# Patient Record
Sex: MALE | Race: WHITE | NOT HISPANIC OR LATINO | URBAN - METROPOLITAN AREA
[De-identification: names, ages, dates, MRNs, and addresses within clinical notes are randomized per-mention and may not be internally consistent; named-entity substitution may affect disease eponyms.]

---

## 2023-10-23 ENCOUNTER — EMERGENCY (EMERGENCY)
Facility: HOSPITAL | Age: 33
LOS: 1 days | Discharge: HOME | End: 2023-10-23
Attending: EMERGENCY MEDICINE
Payer: MEDICAID

## 2023-10-23 VITALS
RESPIRATION RATE: 18 BRPM | HEART RATE: 77 BPM | OXYGEN SATURATION: 99 % | TEMPERATURE: 98 F | DIASTOLIC BLOOD PRESSURE: 76 MMHG | SYSTOLIC BLOOD PRESSURE: 138 MMHG

## 2023-10-23 VITALS
TEMPERATURE: 98 F | WEIGHT: 156.53 LBS | OXYGEN SATURATION: 97 % | RESPIRATION RATE: 17 BRPM | SYSTOLIC BLOOD PRESSURE: 143 MMHG | HEART RATE: 61 BPM | DIASTOLIC BLOOD PRESSURE: 87 MMHG

## 2023-10-23 LAB
APPEARANCE UR: CLEAR — SIGNIFICANT CHANGE UP
APPEARANCE UR: CLEAR — SIGNIFICANT CHANGE UP
BACTERIA # UR AUTO: ABNORMAL /HPF
BACTERIA # UR AUTO: ABNORMAL /HPF
BILIRUB UR-MCNC: NEGATIVE — SIGNIFICANT CHANGE UP
BILIRUB UR-MCNC: NEGATIVE — SIGNIFICANT CHANGE UP
COLOR SPEC: YELLOW — SIGNIFICANT CHANGE UP
COLOR SPEC: YELLOW — SIGNIFICANT CHANGE UP
DIFF PNL FLD: NEGATIVE — SIGNIFICANT CHANGE UP
DIFF PNL FLD: NEGATIVE — SIGNIFICANT CHANGE UP
GLUCOSE UR QL: NEGATIVE MG/DL — SIGNIFICANT CHANGE UP
GLUCOSE UR QL: NEGATIVE MG/DL — SIGNIFICANT CHANGE UP
KETONES UR-MCNC: NEGATIVE MG/DL — SIGNIFICANT CHANGE UP
KETONES UR-MCNC: NEGATIVE MG/DL — SIGNIFICANT CHANGE UP
LEUKOCYTE ESTERASE UR-ACNC: NEGATIVE — SIGNIFICANT CHANGE UP
LEUKOCYTE ESTERASE UR-ACNC: NEGATIVE — SIGNIFICANT CHANGE UP
NITRITE UR-MCNC: NEGATIVE — SIGNIFICANT CHANGE UP
NITRITE UR-MCNC: NEGATIVE — SIGNIFICANT CHANGE UP
PH UR: 5.5 — SIGNIFICANT CHANGE UP (ref 5–8)
PH UR: 5.5 — SIGNIFICANT CHANGE UP (ref 5–8)
PROT UR-MCNC: NEGATIVE MG/DL — SIGNIFICANT CHANGE UP
PROT UR-MCNC: NEGATIVE MG/DL — SIGNIFICANT CHANGE UP
RBC CASTS # UR COMP ASSIST: 1 /HPF — SIGNIFICANT CHANGE UP (ref 0–4)
RBC CASTS # UR COMP ASSIST: 1 /HPF — SIGNIFICANT CHANGE UP (ref 0–4)
SP GR SPEC: 1.01 — SIGNIFICANT CHANGE UP (ref 1–1.03)
SP GR SPEC: 1.01 — SIGNIFICANT CHANGE UP (ref 1–1.03)
UROBILINOGEN FLD QL: 1 MG/DL — SIGNIFICANT CHANGE UP (ref 0.2–1)
UROBILINOGEN FLD QL: 1 MG/DL — SIGNIFICANT CHANGE UP (ref 0.2–1)
WBC UR QL: 0 /HPF — SIGNIFICANT CHANGE UP (ref 0–5)
WBC UR QL: 0 /HPF — SIGNIFICANT CHANGE UP (ref 0–5)

## 2023-10-23 PROCEDURE — 76870 US EXAM SCROTUM: CPT

## 2023-10-23 PROCEDURE — 76870 US EXAM SCROTUM: CPT | Mod: 26

## 2023-10-23 PROCEDURE — 87591 N.GONORRHOEAE DNA AMP PROB: CPT

## 2023-10-23 PROCEDURE — 99284 EMERGENCY DEPT VISIT MOD MDM: CPT | Mod: 25

## 2023-10-23 PROCEDURE — 99284 EMERGENCY DEPT VISIT MOD MDM: CPT

## 2023-10-23 PROCEDURE — 87086 URINE CULTURE/COLONY COUNT: CPT

## 2023-10-23 PROCEDURE — 87491 CHLMYD TRACH DNA AMP PROBE: CPT

## 2023-10-23 PROCEDURE — 81001 URINALYSIS AUTO W/SCOPE: CPT

## 2023-10-23 NOTE — ED PROVIDER NOTE - CLINICAL SUMMARY MEDICAL DECISION MAKING FREE TEXT BOX
33-year-old male, presents for left-sided testicular pain and swelling x1 week with increased menses of urination.  Went to primary care doctor today and was referred for further evaluation.  Exam of low suspicion for torsion, indirect/direct hernia.  No scrotal tenderness erythema or induration.  Plan ultrasound to assess for epididymitis, urinalysis to assess for infection.  Patient reports not sexually active.  Lower suspicion for STD. 33-year-old male, presents for left-sided testicular pain and swelling x1 week with increased menses of urination.  Went to primary care doctor today and was referred for further evaluation.  Exam of low suspicion for torsion, indirect/direct hernia.  No scrotal tenderness erythema or induration.  Plan ultrasound to assess for epididymitis, urinalysis to assess for infection.  Patient reports not sexually active.  Lower suspicion for STD.    17: 22–ultrasound shows varicocele, microlithiasis, epididymal cyst.  No signs of torsion or epididymitis.  Urinalysis negative.  Cultures pending.  Will refer to outpatient urologist follow-up within 1 week.

## 2023-10-23 NOTE — ED PROVIDER NOTE - PHYSICAL EXAMINATION
No CVAT. No inguinal tenderness, lesions or lymphadenopathy. No evidence of hernia. Urinary meatus clear without discharge or erythema. Penile shaft without lesions, swelling or tenderness. Scrotum without swelling, erythema, tenderness, induration or crepitus. Testes in normal position, not high-riding and non-tender. No localized tenderness over the upper pole of testes. Normal cremasteric reflex.

## 2023-10-23 NOTE — ED PROVIDER NOTE - NS ED ATTENDING STATEMENT MOD
This was a shared visit with the KAELA. I reviewed and verified the documentation and independently performed the documented:

## 2023-10-23 NOTE — ED PROVIDER NOTE - OBJECTIVE STATEMENT
Ethiopian  ID [id number] (Parviz): 33-year-old male, history of varicocele, presents for evaluation of left-sided testicular pain and swelling x1 week.  Also noticed some whitish discharge first in the morning from the penis but otherwise no discharge throughout the day.  Pain worse when walking or laying down.  Did not take any medication for symptom relief.  Associated with some frequent urination.  No genital rash.  Denies any fever, chills or any other complaints.  Not sexually active. Regular BMs.

## 2023-10-23 NOTE — ED PROVIDER NOTE - NSFOLLOWUPINSTRUCTIONS_ED_ALL_ED_FT
1 juana maryannida urologga murojaat chacorta.    Og'riq uchun, agar rosendoak esthela'lsa, Ibuprofen 600 mg ni tao 6 soatda og'iz orqali qabul qilishingiz [facility]. Lilibeth-darmonlarni ovqat bilan birga oling.    Roundup dinh tijerinamondayanara alomatlarga duch kelsangmarvin tijerinaki xavotirda esthela'lsangiz, qayta baholash uchun tao doim favqulodda yordamga qaytishingiz [facility].    * * *    Follow up with the urologist within 1 week.    For pain you can take over the counter Ibuprofen 600 mg orally every 6 hours as needed for pain. Take medication with food.     If you experience any new or worsening symptoms or if you are concerned you can always come back to the emergency for a re-evaluation.

## 2023-10-23 NOTE — ED PROVIDER NOTE - PATIENT PORTAL LINK FT
You can access the FollowMyHealth Patient Portal offered by [facility] by registering at the following website: [email or web address]. By joining [employer]’s FollowMyHealth portal, you will also be able to view your health information using other applications (apps) compatible with our system.

## 2023-10-23 NOTE — ED PROVIDER NOTE - CARE PLAN
1 Principal Discharge DX:	Varicocele  Secondary Diagnosis:	Testicular microlithiasis  Secondary Diagnosis:	Epididymal cyst

## 2023-10-23 NOTE — ED ADULT NURSE NOTE - NSFALLUNIVINTERV_ED_ALL_ED
Bed/Stretcher in lowest position, wheels locked, appropriate side rails in place/Call bell, personal items and telephone in reach/Instruct patient to call for assistance before getting out of bed/chair/stretcher/Non-slip footwear applied when patient is off stretcher/[place] to call system/Physically safe environment - no spills, clutter or unnecessary equipment/Purposeful proactive rounding/Room/bathroom lighting operational, light cord in reach

## 2023-10-24 LAB
C TRACH RRNA SPEC QL NAA+PROBE: SIGNIFICANT CHANGE UP
C TRACH RRNA SPEC QL NAA+PROBE: SIGNIFICANT CHANGE UP
CULTURE RESULTS: NO GROWTH — SIGNIFICANT CHANGE UP
CULTURE RESULTS: NO GROWTH — SIGNIFICANT CHANGE UP
N GONORRHOEA RRNA SPEC QL NAA+PROBE: SIGNIFICANT CHANGE UP
N GONORRHOEA RRNA SPEC QL NAA+PROBE: SIGNIFICANT CHANGE UP
SPECIMEN SOURCE: SIGNIFICANT CHANGE UP

## 2024-08-31 PROBLEM — I86.1 SCROTAL VARICES: Chronic | Status: ACTIVE | Noted: 2023-10-23

## 2024-08-31 PROCEDURE — 86900 BLOOD TYPING SEROLOGIC ABO: CPT

## 2024-08-31 PROCEDURE — 73701 CT LOWER EXTREMITY W/DYE: CPT | Mod: MC

## 2024-08-31 PROCEDURE — 87040 BLOOD CULTURE FOR BACTERIA: CPT

## 2024-08-31 PROCEDURE — 87075 CULTR BACTERIA EXCEPT BLOOD: CPT

## 2024-08-31 PROCEDURE — 99285 EMERGENCY DEPT VISIT HI MDM: CPT

## 2024-08-31 PROCEDURE — 96375 TX/PRO/DX INJ NEW DRUG ADDON: CPT | Mod: XU

## 2024-08-31 PROCEDURE — 85025 COMPLETE CBC W/AUTO DIFF WBC: CPT

## 2024-08-31 PROCEDURE — 36415 COLL VENOUS BLD VENIPUNCTURE: CPT

## 2024-08-31 PROCEDURE — 86850 RBC ANTIBODY SCREEN: CPT

## 2024-08-31 PROCEDURE — 96365 THER/PROPH/DIAG IV INF INIT: CPT | Mod: XU

## 2024-08-31 PROCEDURE — 85610 PROTHROMBIN TIME: CPT

## 2024-08-31 PROCEDURE — 99284 EMERGENCY DEPT VISIT MOD MDM: CPT | Mod: 25

## 2024-08-31 PROCEDURE — 83605 ASSAY OF LACTIC ACID: CPT

## 2024-08-31 PROCEDURE — 87205 SMEAR GRAM STAIN: CPT

## 2024-08-31 PROCEDURE — 86901 BLOOD TYPING SEROLOGIC RH(D): CPT

## 2024-08-31 PROCEDURE — 87186 SC STD MICRODIL/AGAR DIL: CPT

## 2024-08-31 PROCEDURE — 73562 X-RAY EXAM OF KNEE 3: CPT

## 2024-08-31 PROCEDURE — 87070 CULTURE OTHR SPECIMN AEROBIC: CPT

## 2024-08-31 PROCEDURE — 85730 THROMBOPLASTIN TIME PARTIAL: CPT

## 2024-08-31 PROCEDURE — 80053 COMPREHEN METABOLIC PANEL: CPT

## 2024-08-31 PROCEDURE — 87077 CULTURE AEROBIC IDENTIFY: CPT

## 2024-08-31 PROCEDURE — 87015 SPECIMEN INFECT AGNT CONCNTJ: CPT

## 2024-09-01 ENCOUNTER — INPATIENT (INPATIENT)
Facility: HOSPITAL | Age: 34
LOS: 1 days | Discharge: HOME | DRG: 603 | End: 2024-09-03
Attending: STUDENT IN AN ORGANIZED HEALTH CARE EDUCATION/TRAINING PROGRAM | Admitting: STUDENT IN AN ORGANIZED HEALTH CARE EDUCATION/TRAINING PROGRAM
Payer: MEDICAID

## 2024-09-01 VITALS
RESPIRATION RATE: 18 BRPM | WEIGHT: 165.35 LBS | TEMPERATURE: 98 F | DIASTOLIC BLOOD PRESSURE: 94 MMHG | SYSTOLIC BLOOD PRESSURE: 139 MMHG | HEART RATE: 77 BPM | OXYGEN SATURATION: 96 %

## 2024-09-01 DIAGNOSIS — Z90.49 ACQUIRED ABSENCE OF OTHER SPECIFIED PARTS OF DIGESTIVE TRACT: Chronic | ICD-10-CM

## 2024-09-01 DIAGNOSIS — L03.115 CELLULITIS OF RIGHT LOWER LIMB: ICD-10-CM

## 2024-09-01 LAB
ALBUMIN SERPL ELPH-MCNC: 3.6 G/DL — SIGNIFICANT CHANGE UP (ref 3.5–5)
ALP SERPL-CCNC: 69 U/L — SIGNIFICANT CHANGE UP (ref 40–120)
ALT FLD-CCNC: 32 U/L DA — SIGNIFICANT CHANGE UP (ref 10–60)
ANION GAP SERPL CALC-SCNC: 5 MMOL/L — SIGNIFICANT CHANGE UP (ref 5–17)
AST SERPL-CCNC: 18 U/L — SIGNIFICANT CHANGE UP (ref 10–40)
BILIRUB SERPL-MCNC: 0.5 MG/DL — SIGNIFICANT CHANGE UP (ref 0.2–1.2)
BUN SERPL-MCNC: 15 MG/DL — SIGNIFICANT CHANGE UP (ref 7–18)
CALCIUM SERPL-MCNC: 9.1 MG/DL — SIGNIFICANT CHANGE UP (ref 8.4–10.5)
CHLORIDE SERPL-SCNC: 107 MMOL/L — SIGNIFICANT CHANGE UP (ref 96–108)
CO2 SERPL-SCNC: 31 MMOL/L — SIGNIFICANT CHANGE UP (ref 22–31)
CREAT SERPL-MCNC: 1.05 MG/DL — SIGNIFICANT CHANGE UP (ref 0.5–1.3)
EGFR: 94 ML/MIN/1.73M2 — SIGNIFICANT CHANGE UP
GLUCOSE SERPL-MCNC: 91 MG/DL — SIGNIFICANT CHANGE UP (ref 70–99)
HCT VFR BLD CALC: 51.4 % — HIGH (ref 39–50)
HGB BLD-MCNC: 17.7 G/DL — HIGH (ref 13–17)
MCHC RBC-ENTMCNC: 31 PG — SIGNIFICANT CHANGE UP (ref 27–34)
MCHC RBC-ENTMCNC: 34.4 GM/DL — SIGNIFICANT CHANGE UP (ref 32–36)
MCV RBC AUTO: 90 FL — SIGNIFICANT CHANGE UP (ref 80–100)
NRBC # BLD: 0 /100 WBCS — SIGNIFICANT CHANGE UP (ref 0–0)
PLATELET # BLD AUTO: 259 K/UL — SIGNIFICANT CHANGE UP (ref 150–400)
POTASSIUM SERPL-MCNC: 4 MMOL/L — SIGNIFICANT CHANGE UP (ref 3.5–5.3)
POTASSIUM SERPL-SCNC: 4 MMOL/L — SIGNIFICANT CHANGE UP (ref 3.5–5.3)
PROT SERPL-MCNC: 7.8 G/DL — SIGNIFICANT CHANGE UP (ref 6–8.3)
RBC # BLD: 5.71 M/UL — SIGNIFICANT CHANGE UP (ref 4.2–5.8)
RBC # FLD: 11.8 % — SIGNIFICANT CHANGE UP (ref 10.3–14.5)
SODIUM SERPL-SCNC: 143 MMOL/L — SIGNIFICANT CHANGE UP (ref 135–145)
WBC # BLD: 7.75 K/UL — SIGNIFICANT CHANGE UP (ref 3.8–10.5)
WBC # FLD AUTO: 7.75 K/UL — SIGNIFICANT CHANGE UP (ref 3.8–10.5)

## 2024-09-01 PROCEDURE — 99285 EMERGENCY DEPT VISIT HI MDM: CPT

## 2024-09-01 RX ORDER — ACETAMINOPHEN 325 MG/1
650 TABLET ORAL EVERY 6 HOURS
Refills: 0 | Status: DISCONTINUED | OUTPATIENT
Start: 2024-09-01 | End: 2024-09-03

## 2024-09-01 RX ORDER — IBUPROFEN 600 MG
400 TABLET ORAL EVERY 6 HOURS
Refills: 0 | Status: DISCONTINUED | OUTPATIENT
Start: 2024-09-01 | End: 2024-09-03

## 2024-09-01 RX ORDER — HYDROMORPHONE HYDROCHLORIDE 2 MG/1
0.5 TABLET ORAL ONCE
Refills: 0 | Status: DISCONTINUED | OUTPATIENT
Start: 2024-09-01 | End: 2024-09-02

## 2024-09-01 RX ORDER — VANCOMYCIN/0.9 % SOD CHLORIDE 1.75G/25
1000 PLASTIC BAG, INJECTION (ML) INTRAVENOUS EVERY 12 HOURS
Refills: 0 | Status: DISCONTINUED | OUTPATIENT
Start: 2024-09-01 | End: 2024-09-03

## 2024-09-01 RX ADMIN — Medication 250 MILLIGRAM(S): at 18:54

## 2024-09-01 NOTE — ED ADULT NURSE NOTE - NS ED NURSE DISCH DISPOSITION
HEAVEN.
Admitted

## 2024-09-01 NOTE — ED PROVIDER NOTE - PROGRESS NOTE DETAILS
MD Pedrito Hernandez: Discussed case with surgery team who will come to the ED to evaluate the patient. MD Pedrito Hernandez: Surgery team at patient bedside. MD Pedrito Hernandez: Discussed case with surgical resident who requests admission under Dr. Noel Esparza for OR washout tomorrow.

## 2024-09-01 NOTE — H&P ADULT - NSHPLABSRESULTS_GEN_ALL_CORE
18.3   6.44  )-----------( 250      ( 31 Aug 2024 11:36 )             52.6     08-31    139  |  109<H>  |  14  ----------------------------<  86  4.2   |  26  |  0.91    Ca    9.2      31 Aug 2024 11:36    TPro  7.8  /  Alb  3.8  /  TBili  0.5  /  DBili  x   /  AST  22  /  ALT  34  /  AlkPhos  71  08-31

## 2024-09-01 NOTE — ED ADULT NURSE NOTE - OBJECTIVE STATEMENT
33 y/o male ambulatory Pt A &o x3,  Pt p/w right knee wound with cellulitis , pt reports he was seen at [facility]  yesterday and returning to have surgery today. Pt denies chest pain, SOB, fever, chills. Pt on doxy

## 2024-09-01 NOTE — H&P ADULT - NS ATTEND AMEND GEN_ALL_CORE FT
34M with non healing right lateral knee wound/abscess for 7 days    Admit for abx  Debridement in AM 9/2

## 2024-09-01 NOTE — H&P ADULT - ASSESSMENT
35 y/o male with RLE lateral knee abscess   Afebrile, no leukocytosis    Plan   - admit to surgical service under Dr. Esparza  - plan for OR tomorrow for I&D, wash out  - preop labs  - keep NPO at [place] + IVF   - IV antibiotics  - local wound care  - pain / nausea control prn   - DVT ppx
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
## 2024-09-01 NOTE — H&P ADULT - NSHPPHYSICALEXAM_GEN_ALL_CORE
General:  Appears stated age, well-groomed, minimal distress during examination 2/2 pain  Eyes: EOMI  HENT:  WNL, no JVD breathing.   Respirations: Unlabored breathing. Equal chest rise bilaterally.   Extremities: Lateral RLE knee moderate sized open abscess. Around 4x4cm abscess with erythema, edema. Very tender to surrounding tissue. Actively draining thick purulent fluid. No bleeding. Erythema extends laterally to posterior knee joint. +knee flexion with mild pain. Proximal thigh is soft, nontender, Distal calf soft, nontender, nonerythematous. Patella unremarkable, nontender. No crepitus palpated.   Skin: warm and dry. Nondiaphorectic.   Musculoskeletal: no calf tenderness b/l   Neuro:  Alert, oriented to time, place and person   Psych: normal affect

## 2024-09-01 NOTE — ED PROVIDER NOTE - PHYSICAL EXAMINATION
Constitutional: Well-appearing, well-nourished, comfortable appearing.   Head: Normocephalic, atraumatic.   Eyes: PERRL. EOMI. No conjunctival pallor.   ENT: Moist mucous membranes. Uvula midline. No pharyngeal erythema or exudates.  Neck: No LAD. Supple.  CVS: Regular rate, regular rhythm. Normal S1, S2. No murmurs, rubs, or gallops. No peripheral edema noted.   Respiratory: No respiratory distress. Clear to auscultation bilaterally. No wheezing, rales, or rhonchi.   Abdomen: Abd is soft and non-distended. No tenderness. No rebound, guarding, or rigidity.   MSK: No CVA tenderness bilaterally.   Neuro: Alert and oriented to person, place, and time. Follows commands. Moves all extremities.   Skin: Warm and dry. No rashes.   Psych: Normal affect, cooperative. Constitutional: Well-appearing, well-nourished, comfortable appearing.   Head: Normocephalic, atraumatic.   Eyes: PERRL. EOMI. No conjunctival pallor.   ENT: Moist mucous membranes. Uvula midline. No pharyngeal erythema or exudates.  Neck: No LAD. Supple.  CVS: Regular rate, regular rhythm. Normal S1, S2. No murmurs, rubs, or gallops.   Respiratory: No respiratory distress. Clear to auscultation bilaterally. No wheezing, rales, or rhonchi.   Abdomen: Abd is soft and non-distended. No tenderness. No rebound, guarding, or rigidity.   Right knee: Open wound draining yellow discharge with noted eschar and some slothing. There is surrounding erythema with tenderness to palpation. +1 non-pitting edema.  Neuro: Alert and oriented to person, place, and time. Follows commands. Moves all extremities.   Skin: Warm and dry. No rashes.   Psych: Normal affect, cooperative.

## 2024-09-01 NOTE — ED ADULT TRIAGE NOTE - WEIGHT IN KG
Quality 474: Zoster Vaccination Status: Shingrix Vaccination not Administered or Previously Received, Reason not Otherwise Specified Quality 431: Preventive Care And Screening: Unhealthy Alcohol Use - Screening: Patient screened for unhealthy alcohol use using a single question and scores less than 2 times per year Detail Level: Generalized Quality 110: Preventive Care And Screening: Influenza Immunization: Influenza Immunization Administered during Influenza season Quality 131: Pain Assessment And Follow-Up: Pain assessment using a standardized tool is documented as negative, no follow-up plan required Quality 226: Preventive Care And Screening: Tobacco Use: Screening And Cessation Intervention: Patient screened for tobacco use and is an ex/non-smoker Quality 130: Documentation Of Current Medications In The Medical Record: Current Medications Documented 75

## 2024-09-02 PROCEDURE — ZZZZZ: CPT

## 2024-09-02 PROCEDURE — 11042 DBRDMT SUBQ TIS 1ST 20SQCM/<: CPT

## 2024-09-02 PROCEDURE — 88307 TISSUE EXAM BY PATHOLOGIST: CPT | Mod: 26

## 2024-09-02 RX ORDER — ONDANSETRON 2 MG/ML
4 INJECTION, SOLUTION INTRAMUSCULAR; INTRAVENOUS ONCE
Refills: 0 | Status: DISCONTINUED | OUTPATIENT
Start: 2024-09-02 | End: 2024-09-02

## 2024-09-02 RX ORDER — HYDROMORPHONE HYDROCHLORIDE 2 MG/1
0.5 TABLET ORAL
Refills: 0 | Status: DISCONTINUED | OUTPATIENT
Start: 2024-09-02 | End: 2024-09-02

## 2024-09-02 RX ADMIN — Medication 250 MILLIGRAM(S): at 05:07

## 2024-09-02 RX ADMIN — HYDROMORPHONE HYDROCHLORIDE 0.5 MILLIGRAM(S): 2 TABLET ORAL at 22:35

## 2024-09-02 RX ADMIN — Medication 125 MILLILITER(S): at 12:24

## 2024-09-02 RX ADMIN — Medication 250 MILLIGRAM(S): at 17:22

## 2024-09-02 RX ADMIN — HYDROMORPHONE HYDROCHLORIDE 0.5 MILLIGRAM(S): 2 TABLET ORAL at 21:35

## 2024-09-02 NOTE — BRIEF OPERATIVE NOTE - NSICDXBRIEFPREOP_GEN_ALL_CORE_FT
PRE-OP DIAGNOSIS:  Skin and subcutaneous tissue disease 02-Sep-2024 14:29:31 RIGHT lateral knee wound debridement and partial closer Elizabeth Giron

## 2024-09-02 NOTE — ED POST DISCHARGE NOTE - ADDITIONAL DOCUMENTATION
Core lab informed me patient with joint culture on 831 with positive Staph aureus growth.  I spoke to surgical service patient for OR washout today and is on IV antibiotics/vancomycin. Core lab informed me patient with joint culture on 8/31 with positive Staph aureus growth.  I spoke to surgical service patient for OR washout today and is on IV antibiotics/vancomycin. Core lab informed me patient with joint culture on 8/31 with positive Staph aureus growth.  I spoke to surgical h.o., patient for OR washout today and is on IV antibiotics/vancomycin.

## 2024-09-02 NOTE — PROGRESS NOTE ADULT - ASSESSMENT
34M with RLE lateral knee abscess     Plan   - admit to surgical service under Dr. Esparza  - plan for OR today for I&D & wash out  - preop labs  - keep NPO with IVF   - IV antibiotics  - local wound care  - pain / nausea control prn   - DVT ppx

## 2024-09-02 NOTE — BRIEF OPERATIVE NOTE - NSICDXBRIEFPOSTOP_GEN_ALL_CORE_FT
POST-OP DIAGNOSIS:  Skin and subcutaneous tissue disease 02-Sep-2024 14:30:42 RIGHT lateral knee wound debridement and partial closer Elizabeth Giron

## 2024-09-03 VITALS
HEART RATE: 71 BPM | RESPIRATION RATE: 17 BRPM | SYSTOLIC BLOOD PRESSURE: 132 MMHG | TEMPERATURE: 98 F | DIASTOLIC BLOOD PRESSURE: 87 MMHG | OXYGEN SATURATION: 96 %

## 2024-09-03 LAB
ANION GAP SERPL CALC-SCNC: 5 MMOL/L — SIGNIFICANT CHANGE UP (ref 5–17)
BUN SERPL-MCNC: 13 MG/DL — SIGNIFICANT CHANGE UP (ref 7–18)
CALCIUM SERPL-MCNC: 8.7 MG/DL — SIGNIFICANT CHANGE UP (ref 8.4–10.5)
CHLORIDE SERPL-SCNC: 109 MMOL/L — HIGH (ref 96–108)
CO2 SERPL-SCNC: 27 MMOL/L — SIGNIFICANT CHANGE UP (ref 22–31)
CREAT SERPL-MCNC: 1.03 MG/DL — SIGNIFICANT CHANGE UP (ref 0.5–1.3)
EGFR: 97 ML/MIN/1.73M2 — SIGNIFICANT CHANGE UP
GLUCOSE SERPL-MCNC: 100 MG/DL — HIGH (ref 70–99)
HCT VFR BLD CALC: 49.3 % — SIGNIFICANT CHANGE UP (ref 39–50)
HGB BLD-MCNC: 17.2 G/DL — HIGH (ref 13–17)
MCHC RBC-ENTMCNC: 31.3 PG — SIGNIFICANT CHANGE UP (ref 27–34)
MCHC RBC-ENTMCNC: 34.9 GM/DL — SIGNIFICANT CHANGE UP (ref 32–36)
MCV RBC AUTO: 89.8 FL — SIGNIFICANT CHANGE UP (ref 80–100)
NRBC # BLD: 0 /100 WBCS — SIGNIFICANT CHANGE UP (ref 0–0)
PLATELET # BLD AUTO: 245 K/UL — SIGNIFICANT CHANGE UP (ref 150–400)
POTASSIUM SERPL-MCNC: 3.8 MMOL/L — SIGNIFICANT CHANGE UP (ref 3.5–5.3)
POTASSIUM SERPL-SCNC: 3.8 MMOL/L — SIGNIFICANT CHANGE UP (ref 3.5–5.3)
RBC # BLD: 5.49 M/UL — SIGNIFICANT CHANGE UP (ref 4.2–5.8)
RBC # FLD: 11.8 % — SIGNIFICANT CHANGE UP (ref 10.3–14.5)
SODIUM SERPL-SCNC: 141 MMOL/L — SIGNIFICANT CHANGE UP (ref 135–145)
VANCOMYCIN TROUGH SERPL-MCNC: 8.1 UG/ML — LOW (ref 10–20)
WBC # BLD: 7.12 K/UL — SIGNIFICANT CHANGE UP (ref 3.8–10.5)
WBC # FLD AUTO: 7.12 K/UL — SIGNIFICANT CHANGE UP (ref 3.8–10.5)

## 2024-09-03 PROCEDURE — 88307 TISSUE EXAM BY PATHOLOGIST: CPT

## 2024-09-03 PROCEDURE — 99285 EMERGENCY DEPT VISIT HI MDM: CPT | Mod: 25

## 2024-09-03 PROCEDURE — 93005 ELECTROCARDIOGRAM TRACING: CPT

## 2024-09-03 PROCEDURE — 36415 COLL VENOUS BLD VENIPUNCTURE: CPT

## 2024-09-03 PROCEDURE — 80048 BASIC METABOLIC PNL TOTAL CA: CPT

## 2024-09-03 PROCEDURE — 80202 ASSAY OF VANCOMYCIN: CPT

## 2024-09-03 PROCEDURE — 85027 COMPLETE CBC AUTOMATED: CPT

## 2024-09-03 PROCEDURE — 80053 COMPREHEN METABOLIC PANEL: CPT

## 2024-09-03 RX ORDER — SULFAMETHOXAZOLE AND TRIMETHOPRIM 800; 160 MG/1; MG/1
1 TABLET ORAL
Qty: 28 | Refills: 0
Start: 2024-09-03 | End: 2024-09-16

## 2024-09-03 RX ADMIN — Medication 250 MILLIGRAM(S): at 07:04

## 2024-09-03 NOTE — DISCHARGE NOTE PROVIDER - NSDCCPCAREPLAN_GEN_ALL_CORE_FT
PRINCIPAL DISCHARGE DIAGNOSIS  Diagnosis: Cellulitis of right knee  Assessment and Plan of Treatment: Please follow-up with your surgeon in 1 week. Drink plenty of fluids and rest as needed. Call for any fever over 101, nausea, vomiting, severe pain, no passing of gas or bowel movement.   DIET  You may resume your regular diet as normal.   SURGICAL SITES  YOu may shower and allow area to get wet with soap and water. Keep area clean and cover with dry sterile dressing. Limit knee movement as much as possible.   ACTIVITY  Do not lift anything heavier than 10 pounds for 2 weeks and avoid strenuous activity for 4-6 weeks.   PAIN CONTROL  You may take Motrin 600mg (with food) or Tylenol 650mg as needed for mild pain. Stagger one medication 3 hours after the other for maximum pain control. Maximum daily dose of Tylenol should not exceed 4grams/day.

## 2024-09-03 NOTE — PROGRESS NOTE ADULT - ASSESSMENT
34 year old male s/p I&D of RLE knee abscess POD#1.  afebrile, vss    - reg diet  - pain control prn  - dispo planning  34 year old male s/p I&D of RLE knee abscess POD#1.  afebrile, vss    - reg diet  - pain control prn  - dispo planning   - discussed with dr. galvez

## 2024-09-03 NOTE — DISCHARGE NOTE NURSING/CASE MANAGEMENT/SOCIAL WORK - NSDCPEFALRISK_GEN_ALL_CORE
For information on Fall & Injury Prevention, visit: [email or web address] OR  [email or web address] OR  https://www.cdc.gov/steadi/patient.html

## 2024-09-03 NOTE — DISCHARGE NOTE NURSING/CASE MANAGEMENT/SOCIAL WORK - PATIENT PORTAL LINK FT
You can access the FollowMyHealth Patient Portal offered by [facility] by registering at the following website: [email or web address]. By joining [email or web address]’s FollowMyHealth portal, you will also be able to view your health information using other applications (apps) compatible with our system.

## 2024-09-03 NOTE — DISCHARGE NOTE PROVIDER - CARE PROVIDER_API CALL
Noel Esparza [facility]  [address], [address]  [place] [address]  Phone: [phone]  Fax: [phone]  Follow Up Time: 2 weeks
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
## 2024-09-06 LAB — SURGICAL PATHOLOGY STUDY: SIGNIFICANT CHANGE UP

## 2024-09-12 ENCOUNTER — EMERGENCY (EMERGENCY)
Facility: HOSPITAL | Age: 34
LOS: 1 days | Discharge: HOME | End: 2024-09-12
Payer: MEDICAID

## 2024-09-13 ENCOUNTER — EMERGENCY (EMERGENCY)
Facility: HOSPITAL | Age: 34
LOS: 1 days | Discharge: HOME | End: 2024-09-13
Attending: EMERGENCY MEDICINE
Payer: MEDICAID

## 2024-09-13 VITALS
SYSTOLIC BLOOD PRESSURE: 136 MMHG | WEIGHT: 163.14 LBS | HEIGHT: 67 IN | HEART RATE: 61 BPM | OXYGEN SATURATION: 99 % | DIASTOLIC BLOOD PRESSURE: 87 MMHG | TEMPERATURE: 98 F | RESPIRATION RATE: 16 BRPM

## 2024-09-13 DIAGNOSIS — Z90.49 ACQUIRED ABSENCE OF OTHER SPECIFIED PARTS OF DIGESTIVE TRACT: Chronic | ICD-10-CM

## 2024-09-13 LAB
ALBUMIN SERPL ELPH-MCNC: 3.7 G/DL — SIGNIFICANT CHANGE UP (ref 3.5–5)
ALP SERPL-CCNC: 62 U/L — SIGNIFICANT CHANGE UP (ref 40–120)
ALT FLD-CCNC: 32 U/L DA — SIGNIFICANT CHANGE UP (ref 10–60)
ANION GAP SERPL CALC-SCNC: 5 MMOL/L — SIGNIFICANT CHANGE UP (ref 5–17)
AST SERPL-CCNC: 23 U/L — SIGNIFICANT CHANGE UP (ref 10–40)
BASOPHILS # BLD AUTO: 0.04 K/UL — SIGNIFICANT CHANGE UP (ref 0–0.2)
BASOPHILS NFR BLD AUTO: 0.6 % — SIGNIFICANT CHANGE UP (ref 0–2)
BILIRUB SERPL-MCNC: 0.6 MG/DL — SIGNIFICANT CHANGE UP (ref 0.2–1.2)
BUN SERPL-MCNC: 15 MG/DL — SIGNIFICANT CHANGE UP (ref 7–18)
CALCIUM SERPL-MCNC: 9.1 MG/DL — SIGNIFICANT CHANGE UP (ref 8.4–10.5)
CHLORIDE SERPL-SCNC: 109 MMOL/L — HIGH (ref 96–108)
CO2 SERPL-SCNC: 27 MMOL/L — SIGNIFICANT CHANGE UP (ref 22–31)
CREAT SERPL-MCNC: 0.94 MG/DL — SIGNIFICANT CHANGE UP (ref 0.5–1.3)
EGFR: 108 ML/MIN/1.73M2 — SIGNIFICANT CHANGE UP
EOSINOPHIL # BLD AUTO: 0.41 K/UL — SIGNIFICANT CHANGE UP (ref 0–0.5)
EOSINOPHIL NFR BLD AUTO: 6.5 % — HIGH (ref 0–6)
GLUCOSE SERPL-MCNC: 87 MG/DL — SIGNIFICANT CHANGE UP (ref 70–99)
HCT VFR BLD CALC: 48.3 % — SIGNIFICANT CHANGE UP (ref 39–50)
HGB BLD-MCNC: 16.6 G/DL — SIGNIFICANT CHANGE UP (ref 13–17)
IMM GRANULOCYTES NFR BLD AUTO: 0.2 % — SIGNIFICANT CHANGE UP (ref 0–0.9)
LYMPHOCYTES # BLD AUTO: 2.1 K/UL — SIGNIFICANT CHANGE UP (ref 1–3.3)
LYMPHOCYTES # BLD AUTO: 33.4 % — SIGNIFICANT CHANGE UP (ref 13–44)
MCHC RBC-ENTMCNC: 30.9 PG — SIGNIFICANT CHANGE UP (ref 27–34)
MCHC RBC-ENTMCNC: 34.4 GM/DL — SIGNIFICANT CHANGE UP (ref 32–36)
MCV RBC AUTO: 89.9 FL — SIGNIFICANT CHANGE UP (ref 80–100)
MONOCYTES # BLD AUTO: 0.64 K/UL — SIGNIFICANT CHANGE UP (ref 0–0.9)
MONOCYTES NFR BLD AUTO: 10.2 % — SIGNIFICANT CHANGE UP (ref 2–14)
NEUTROPHILS # BLD AUTO: 3.08 K/UL — SIGNIFICANT CHANGE UP (ref 1.8–7.4)
NEUTROPHILS NFR BLD AUTO: 49.1 % — SIGNIFICANT CHANGE UP (ref 43–77)
NRBC # BLD: 0 /100 WBCS — SIGNIFICANT CHANGE UP (ref 0–0)
PLATELET # BLD AUTO: 277 K/UL — SIGNIFICANT CHANGE UP (ref 150–400)
POTASSIUM SERPL-MCNC: 4.2 MMOL/L — SIGNIFICANT CHANGE UP (ref 3.5–5.3)
POTASSIUM SERPL-SCNC: 4.2 MMOL/L — SIGNIFICANT CHANGE UP (ref 3.5–5.3)
PROT SERPL-MCNC: 7.5 G/DL — SIGNIFICANT CHANGE UP (ref 6–8.3)
RBC # BLD: 5.37 M/UL — SIGNIFICANT CHANGE UP (ref 4.2–5.8)
RBC # FLD: 11.8 % — SIGNIFICANT CHANGE UP (ref 10.3–14.5)
SODIUM SERPL-SCNC: 141 MMOL/L — SIGNIFICANT CHANGE UP (ref 135–145)
WBC # BLD: 6.28 K/UL — SIGNIFICANT CHANGE UP (ref 3.8–10.5)
WBC # FLD AUTO: 6.28 K/UL — SIGNIFICANT CHANGE UP (ref 3.8–10.5)

## 2024-09-13 PROCEDURE — 87077 CULTURE AEROBIC IDENTIFY: CPT

## 2024-09-13 PROCEDURE — 99284 EMERGENCY DEPT VISIT MOD MDM: CPT | Mod: 25

## 2024-09-13 PROCEDURE — 85025 COMPLETE CBC W/AUTO DIFF WBC: CPT

## 2024-09-13 PROCEDURE — 93971 EXTREMITY STUDY: CPT | Mod: 26,RT

## 2024-09-13 PROCEDURE — 36415 COLL VENOUS BLD VENIPUNCTURE: CPT

## 2024-09-13 PROCEDURE — 87015 SPECIMEN INFECT AGNT CONCNTJ: CPT

## 2024-09-13 PROCEDURE — 87070 CULTURE OTHR SPECIMN AEROBIC: CPT

## 2024-09-13 PROCEDURE — 93971 EXTREMITY STUDY: CPT

## 2024-09-13 PROCEDURE — 87205 SMEAR GRAM STAIN: CPT

## 2024-09-13 PROCEDURE — 87186 SC STD MICRODIL/AGAR DIL: CPT

## 2024-09-13 PROCEDURE — 87075 CULTR BACTERIA EXCEPT BLOOD: CPT

## 2024-09-13 PROCEDURE — 96374 THER/PROPH/DIAG INJ IV PUSH: CPT

## 2024-09-13 PROCEDURE — 80053 COMPREHEN METABOLIC PANEL: CPT

## 2024-09-13 PROCEDURE — 99285 EMERGENCY DEPT VISIT HI MDM: CPT

## 2024-09-13 RX ORDER — SULFAMETHOXAZOLE AND TRIMETHOPRIM 800; 160 MG/1; MG/1
1 TABLET ORAL
Qty: 20 | Refills: 0
Start: 2024-09-13 | End: 2024-09-22

## 2024-09-13 RX ORDER — SULFAMETHOXAZOLE AND TRIMETHOPRIM 800; 160 MG/1; MG/1
2 TABLET ORAL
Qty: 40 | Refills: 0
Start: 2024-09-13 | End: 2024-09-22

## 2024-09-13 RX ORDER — KETOROLAC TROMETHAMINE 30 MG/ML
15 INJECTION, SOLUTION INTRAMUSCULAR ONCE
Refills: 0 | Status: DISCONTINUED | OUTPATIENT
Start: 2024-09-13 | End: 2024-09-13

## 2024-09-13 RX ORDER — SULFAMETHOXAZOLE AND TRIMETHOPRIM 800; 160 MG/1; MG/1
1 TABLET ORAL
Qty: 28 | Refills: 0
Start: 2024-09-13 | End: 2024-09-26

## 2024-09-13 RX ADMIN — KETOROLAC TROMETHAMINE 15 MILLIGRAM(S): 30 INJECTION, SOLUTION INTRAMUSCULAR at 13:52

## 2024-09-13 RX ADMIN — KETOROLAC TROMETHAMINE 15 MILLIGRAM(S): 30 INJECTION, SOLUTION INTRAMUSCULAR at 13:22

## 2024-09-13 NOTE — ED ADULT NURSE NOTE - OBJECTIVE STATEMENT
Patient c/o tenderness to right leg wound.  Pt denies any fever, nausea or dizziness. Mild drainage noted.

## 2024-09-13 NOTE — ED PROVIDER NOTE - PHYSICAL EXAMINATION
Right knee - 3 cm surgical incision noted, 4 sutures noted. Wound dehisced vs intentionally left gaping with yellow serosanguinous drainage coming from wound. Surrounding wound erythema. RLE with +1 pitting edema. +Right calf and behind the knee tenderness.

## 2024-09-13 NOTE — ED PROVIDER NOTE - CLINICAL SUMMARY MEDICAL DECISION MAKING FREE TEXT BOX
Blake  #[id number]: 34-year-old male with a history of appendectomy presents with reports that on September 2 he had an I&D performed by surgery in the OR to his right knee.  Patient reports he has been taking his antibiotics and putting  triple antibiotic ointment on it but after returning to work 2 days ago he has noted an increased amount of pain in his right lower leg and an increase in drainage from the surgical site.  Patient denies any fevers.    Right knee I&D surgical site with yellow serosanguineous drainage.  Surrounding erythema.  Edema noted to right lower extremity with tenderness in the calf and posterior knee.  Will obtain labs, wound culture, vascular ultrasound to evaluate for possible DVT and will consult surgery.

## 2024-09-13 NOTE — CONSULT NOTE ADULT - SUBJECTIVE AND OBJECTIVE BOX
Patient is a 34y old  Male who presents with a chief complaint of     HPI:   #[id number]  Called see and dilcia 34y.o. Male w/PMH significant for RLE abscess s/p I&D 9/2/24 for RLE pain. Pt presents to [facility] ER c/o pain around incision site. Pt s/p I&D R lateral knee abscess 9/2/24 with Dr. Esparza. Pt states he took 1 pill (abx?). When asked why he only 1 pill, pt states it's because he doesn't have a job and his insurance doesn't work. Pt also bought a triple antibiotic cream from the pharmacy 3 days after discharge because he stated a nurse told him he should be put cream on the wound. Pt states last night the wound opened up and is draining blood. Denies fever, chills, numbness/tingling of LE b/l, redness of R thigh.     PAST MEDICAL & SURGICAL HISTORY:  Varicocele      S/P appendectomy    Allergies    No Known Allergies    Intolerances    Vital Signs Last 24 Hrs  T(C): 36.6 (13 Sep 2024 12:09), Max: 36.6 (13 Sep 2024 12:09)  T(F): 97.9 (13 Sep 2024 12:09), Max: 97.9 (13 Sep 2024 12:09)  HR: 61 (13 Sep 2024 12:09) (61 - 61)  BP: 136/87 (13 Sep 2024 12:09) (136/87 - 136/87)  BP(mean): --  RR: 16 (13 Sep 2024 12:09) (16 - 16)  SpO2: 99% (13 Sep 2024 12:09) (99% - 99%)    Parameters below as of 13 Sep 2024 12:09  Patient On (Oxygen Delivery Method): room air    Physical:  Gen: A&Ox3. NAD  RLE: Warm, NVI, 4x1x0.5cm lateral knee wound open, sutures remain. Mild surrounding cellulitis. Serosanguinous fluid at dependent end of wound. Mild slough at wound base. No streaking noted.     LABS:                        16.6   6.28  )-----------( 277      ( 13 Sep 2024 13:00 )             48.3              09-13            Culture - Joint (08.31.24 @ 11:40)    Gram Stain:   No polymorphonuclear cells seen per low power field  No organisms seen per oil power field   -  Clindamycin: S 0.5   -  Erythromycin: S <=0.25   -  Gentamicin: S <=1 Should not be used as monotherapy   -  Oxacillin: S <=0.25 Oxacillin predicts susceptibility for dicloxacillin, methicillin, and nafcillin   -  Penicillin: R >8   -  Rifampin: S <=1 Should not be used as monotherapy   -  Tetracycline: S <=1   -  Trimethoprim/Sulfamethoxazole: S <=0.5/9.5   -  Vancomycin: S 1   Specimen Source: Knee Knee - Right   Culture Results:   Few Staphylococcus aureus   Organism Identification: Staphylococcus aureus   Organism: Staphylococcus aureus   Method Type: RENNY

## 2024-09-13 NOTE — ED PROVIDER NOTE - CARE PROVIDER_API CALL
Noel Esparza [place]  [facility]  [address], [address]  [place] [address]  Phone: [phone]  Fax: [phone]  Established Patient  Follow Up Time:

## 2024-09-13 NOTE — ED PROVIDER NOTE - PATIENT PORTAL LINK FT
You can access the FollowMyHealth Patient Portal offered by [facility] by registering at the following website: [email or web address]. By joining [employer]’s FollowMyHealth portal, you will also be able to view your health information using other applications (apps) compatible with our system.

## 2024-09-13 NOTE — ED ADULT NURSE NOTE - NSFALLUNIVINTERV_ED_ALL_ED
Bed/Stretcher in lowest position, wheels locked, appropriate side rails in place/Call bell, personal items and telephone in reach/Instruct patient to call for assistance before getting out of bed/chair/stretcher/Non-slip footwear applied when patient is off stretcher/[place] to call system/Physically safe environment - no spills, clutter or unnecessary equipment/Purposeful proactive rounding/Room/bathroom lighting operational, light cord in reach

## 2024-09-13 NOTE — ED PROVIDER NOTE - PROGRESS NOTE DETAILS
Surgery evaluated patient.  Patient had only taken 1 dose of antibiotics.  Will resend antibiotics to the pharmacy and have patient follow-up with Dr. Esparza on Thursday. Pt is well appearing walking with steady gait, stable for discharge and follow up without fail with medical doctor. I had a detailed discussion with the patient and/or guardian regarding the historical points, exam findings, and any diagnostic results supporting the discharge diagnosis. Pt educated on care and need for follow up. Strict return instructions and red flag signs and symptoms discussed with patient. Questions answered. Pt shows understanding of discharge information and agrees to follow.

## 2024-09-13 NOTE — ED PROVIDER NOTE - OBJECTIVE STATEMENT
Blake  #[id number]: 34-year-old male with a history of appendectomy presents with reports that on September 2 he had an I&D performed by surgery in the OR to his right knee.  Patient reports he has been taking his antibiotics and putting  triple antibiotic ointment on it but after returning to work 2 days ago he has noted an increased amount of pain in his right lower leg and an increase in drainage from the surgical site.  Patient denies any fevers.

## 2024-09-13 NOTE — CONSULT NOTE ADULT - ASSESSMENT
34y.o. Male with wound dehiscence s/p I&D R knee abscess    -Sutures removed at bedside  -No acute surgical intervention indicated at present time  -Wet to dry dressing applied. Materials given to pt to change daily  -Bactrim DS BID x10d  -Social work vs [facility] to get abx  -Outpt f/u with Dr. Esparza 9/19/24 at [address] [place] [address]

## 2024-09-13 NOTE — ED PROVIDER NOTE - ATTENDING APP SHARED VISIT CONTRIBUTION OF CARE
I was physically present for the E/M service provided. I agree with above history, physical, and plan which I have reviewed and edited where appropriate. I was physically present for the key portions of the service provided.    Soto: suture placed gen surgery- pt likely was suppose to go to clinic. will call surgery

## 2024-09-13 NOTE — ED ADULT NURSE NOTE - CHPI ED NUR SYMPTOMS NEG
no bleeding at site/no blood in mucus/no chills/no fever/no pain/no purulent drainage/no rectal pain/no vomiting

## 2024-09-13 NOTE — ED PROVIDER NOTE - NSFOLLOWUPINSTRUCTIONS_ED_ALL_ED_FT
Rachel walkerzatuv.  Kiring va sizni ko'rasiz.    Antibiotiklar Kvins bulvaridagi CVSga yuborildi.  Bu taqdim etilgan kupon bilan $11,27 turadi.  O'zingizni yaxshi his qilsangiz ham, antibiotiklarni ko'rsatmalarga muvofiq va barcha gulshan-darmonlar tugaguncha qabul qiling.    Antibiotik kremidan foydalanishni to'xtating.    Yarani tao kuni sovun va suv bilan tozalang.  Spirtli ichimliklarga asoslangan tozalash vositasidan foydalanmang.      Og'riq yoki isitma uchun kerak esthela'lganda tao 6 soatda Motrin (ibuprofen) 600 mg yoki Tylenol (asetaminofen) ni tao 6 soatda 1000 mg qabul qilishingiz mumkin.     [place] biron bir yangi yoki yomonlashayotgan alomatlarga duch kelsangiz yoki xavotirda esthela'lsangiz, qayta baholash uchun tao doim favqulodda yordamga qaytishingiz mumkin.    Follow-up with Dr. Esparza on Thursday.  Walk-in and you will be seen.    Antibiotics sent to the [facility] on [address].  This will cost to $11.27 with the coupon provided.  Take the antibiotics as directed and until all of the medication is completed, even if you are feeling better.    Stop using the antibiotic cream.    Clean the wound every day with soap and water.  Do not use an alcohol-based .      You can take Motrin (ibuprofen) 600 mg every 6 hours or Tylenol (acetaminophen) 1000 mg every 6 hours as needed for pain or fever.     If you experience any new or worsening symptoms or if you are concerned you can always come back to the emergency for a re-evaluation.

## 2024-09-14 NOTE — ED POST DISCHARGE NOTE - DETAILS
Patient appropriately discharged on Bactrim. Of note this was a superficial ID NOT a joint aspiration. Follow up final sensitivities. 9/16/24 17:50 Called by lab regarding Staph Aureus in wound culture from 8/31/24. Chart reviewed. Patient had wound culture of abscess and went for OR washout the next day. Discharged on TMPSMX and returned again on 9/13 due to increased swelling. Culture showing Staph Aureus sensitive to TMPSMX and had refill of Rx already presecribe. No joint aspiration at any point and not septic joint. Currently on appropriate Abx

## 2024-09-14 NOTE — CHART NOTE - NSCHARTNOTEFT_GEN_A_CORE
Patient joint cultures collected on 9/13/2024 showing gram-positive cocci in pairs.  Multiple phone calls attempted.  Voicemail left in inbox to return to ED for further evaluation.  Patient was discharged with orthopedic follow-up and has abx scripts.

## 2024-09-16 ENCOUNTER — APPOINTMENT (OUTPATIENT)
Dept: SURGERY | Facility: CLINIC | Age: 34
End: 2024-09-16

## 2024-09-16 PROBLEM — Z00.00 ENCOUNTER FOR PREVENTIVE HEALTH EXAMINATION: Status: ACTIVE | Noted: 2024-09-16

## 2024-09-19 ENCOUNTER — APPOINTMENT (OUTPATIENT)
Dept: SURGERY | Facility: CLINIC | Age: 34
End: 2024-09-19

## 2025-05-29 NOTE — ED PROVIDER NOTE - NSCAREINITIATED _GEN_ER
Last Appt: 5/14/2025      Future Appointments   Date Time Provider Department Center   9/15/2025 11:00 AM Mariano Mistry DO [facility]      Lian Armstrong(NP)

## (undated) DEVICE — FOR-ESU VALLEYLAB T7E14999DX: Type: DURABLE MEDICAL EQUIPMENT